# Patient Record
Sex: FEMALE | Race: WHITE | Employment: FULL TIME | URBAN - METROPOLITAN AREA
[De-identification: names, ages, dates, MRNs, and addresses within clinical notes are randomized per-mention and may not be internally consistent; named-entity substitution may affect disease eponyms.]

---

## 2017-09-19 ENCOUNTER — OFFICE VISIT (OUTPATIENT)
Dept: OBGYN CLINIC | Age: 37
End: 2017-09-19
Payer: COMMERCIAL

## 2017-09-19 ENCOUNTER — HOSPITAL ENCOUNTER (OUTPATIENT)
Age: 37
Setting detail: SPECIMEN
Discharge: HOME | End: 2017-09-19
Payer: COMMERCIAL

## 2017-09-19 VITALS
DIASTOLIC BLOOD PRESSURE: 78 MMHG | HEIGHT: 67 IN | WEIGHT: 233 LBS | BODY MASS INDEX: 36.57 KG/M2 | SYSTOLIC BLOOD PRESSURE: 122 MMHG

## 2017-09-19 DIAGNOSIS — R63.5 WEIGHT GAIN: ICD-10-CM

## 2017-09-19 DIAGNOSIS — Z12.31 ENCOUNTER FOR MAMMOGRAM TO ESTABLISH BASELINE MAMMOGRAM: ICD-10-CM

## 2017-09-19 DIAGNOSIS — R10.2 PELVIC PAIN IN FEMALE: ICD-10-CM

## 2017-09-19 DIAGNOSIS — N63.10 BREAST MASS, RIGHT: Primary | ICD-10-CM

## 2017-09-19 DIAGNOSIS — N95.0 POSTMENOPAUSAL BLEEDING: ICD-10-CM

## 2017-09-19 PROCEDURE — 99214 OFFICE O/P EST MOD 30 MIN: CPT | Performed by: OBSTETRICS & GYNECOLOGY

## 2017-09-19 PROCEDURE — 57505 ENDOCERVICAL CURETTAGE: CPT | Performed by: OBSTETRICS & GYNECOLOGY

## 2017-09-19 NOTE — PROGRESS NOTES
adhesions     OVARY REMOVAL Right     with c section due to cyst     Family History   Problem Relation Age of Onset    Diabetes Maternal Grandmother     Colon Cancer Maternal Grandmother     Diabetes Maternal Grandfather     Hypertension Maternal Grandfather     Colon Cancer Maternal Grandfather     Diabetes Maternal Aunt     Hypertension Maternal Aunt     Colon Cancer Maternal Aunt     Diabetes Maternal Aunt     Colon Cancer Maternal Aunt     Breast Cancer Maternal Aunt     Colon Cancer Maternal Uncle     Heart Attack Mother      History   Smoking Status    Former Smoker    Quit date: 3/25/2001   Smokeless Tobacco    Never Used     History   Alcohol Use    0.0 oz/week    0 Standard drinks or equivalent per week     Comment: social     Current Outpatient Prescriptions   Medication Sig Dispense Refill    Cholecalciferol (VITAMIN D PO) Take 4,000 Int'l Units by mouth daily.  vitamin B-12 (CYANOCOBALAMIN) 1000 MCG tablet Take 1,000 mcg by mouth daily.  Multiple Vitamin (MULTI-VITAMIN DAILY PO) Take  by mouth. No current facility-administered medications for this visit. Allergies: Allergies   Allergen Reactions    Penicillins Hives and Swelling       Gynecologic History:  No LMP recorded.   Sexually Active: Yes  STD History: No  Birth Control: No    Obstetric History       T1      L1     SAB0   TAB0   Ectopic0   Molar0   Multiple0   Live Births1      ______________________________________________________________________  REVIEW OF SYSTEMS:        Constitutional:  Unexpected weight change yes  Neurological:  Frequent headaches  no  Ophthalmic:  Recent visual changes no  ENT:   Difficulty swallowing  no  Breast:              Masses   no     Respiratory:  Shortness of breath  no    Cardiovascular: Chest pain   no     Gastrointestinal: Chronic diarrhea/constipation no   Urogenital:  Urinary incontinence  no                                         Heavy/irregular nontender and without abnormal masses bilaterally. Extremities:  FROM and nontender without clubbing cyanosis or edema. ASSESSMENT:       1. Breast mass, right  [facility] Diagnostic Bilateral   2. Encounter for mammogram to establish baseline mammogram     3. Postmenopausal bleeding  Prolactin    TSH without Reflex    Hemoglobin A1C    [place] ENDOCERVICAL CURETTAGE    US PELVIS COMPLETE    US Non OB Transvaginal   4. Weight gain  Prolactin    TSH without Reflex    Hemoglobin A1C   5. Pelvic pain in female  [facility] Non OB Transvaginal                   PLAN:  After reviewing her laboratory findings with her we had a thorough discussion regarding what appears to be post menopausal bleeding. We discussed EMBx in the office and she agrees to proceed. This was done uneventfully with the uterus measuring 9-10 cm and a moderate amount of specimen was obtained and sent  to pathology. Serum FSH was repeated with TSH, prolactin and hemoglobin A1c. Mammogram with right breast ultrasound if necessary also ordered. Pelvic ultrasound ordered. She will be contacted with all her results and recommendation for follow-up. Patient was seen with total face to face time of 20 minutes. Raleigh Jensen M.D., Mph  [facility]  Aj

## 2017-09-20 ENCOUNTER — HOSPITAL ENCOUNTER (OUTPATIENT)
Dept: ULTRASOUND IMAGING | Facility: CLINIC | Age: 37
Discharge: HOME | End: 2017-09-20
Payer: COMMERCIAL

## 2017-09-20 ENCOUNTER — HOSPITAL ENCOUNTER (OUTPATIENT)
Age: 37
Discharge: HOME | End: 2017-09-20
Payer: COMMERCIAL

## 2017-09-20 DIAGNOSIS — N92.6 IRREGULAR BLEEDING: ICD-10-CM

## 2017-09-20 LAB
ESTIMATED AVERAGE GLUCOSE: 91 MG/DL
HBA1C MFR BLD: 4.8 % (ref 4–6)
PROLACTIN: 14.62 UG/L (ref 4.79–23.3)
TSH SERPL DL<=0.05 MIU/L-ACNC: 2.55 MIU/L (ref 0.3–5)

## 2017-09-20 PROCEDURE — 76856 US EXAM PELVIC COMPLETE: CPT

## 2017-09-20 PROCEDURE — 83001 ASSAY OF GONADOTROPIN (FSH): CPT

## 2017-09-20 PROCEDURE — 76830 TRANSVAGINAL US NON-OB: CPT

## 2017-09-20 PROCEDURE — 84146 ASSAY OF PROLACTIN: CPT

## 2017-09-20 PROCEDURE — 36415 COLL VENOUS BLD VENIPUNCTURE: CPT

## 2017-09-20 PROCEDURE — 83036 HEMOGLOBIN GLYCOSYLATED A1C: CPT

## 2017-09-20 PROCEDURE — 84443 ASSAY THYROID STIM HORMONE: CPT

## 2017-09-21 DIAGNOSIS — N95.0 POSTMENOPAUSAL BLEEDING: Primary | ICD-10-CM

## 2017-09-21 LAB
CYTOLOGY REPORT: NORMAL
FOLLICLE STIMULATING HORMONE: 15.6 U/L (ref 1.7–21.5)
SURGICAL PATHOLOGY REPORT: NORMAL